# Patient Record
Sex: FEMALE
[De-identification: names, ages, dates, MRNs, and addresses within clinical notes are randomized per-mention and may not be internally consistent; named-entity substitution may affect disease eponyms.]

---

## 2023-02-21 ENCOUNTER — HOSPITAL ENCOUNTER (EMERGENCY)
Dept: HOSPITAL 97 - ER | Age: 53
Discharge: HOME | End: 2023-02-21
Payer: COMMERCIAL

## 2023-02-21 VITALS — TEMPERATURE: 98.8 F

## 2023-02-21 VITALS — SYSTOLIC BLOOD PRESSURE: 113 MMHG | DIASTOLIC BLOOD PRESSURE: 55 MMHG | OXYGEN SATURATION: 98 %

## 2023-02-21 DIAGNOSIS — K21.9: Primary | ICD-10-CM

## 2023-02-21 LAB
ALBUMIN SERPL BCP-MCNC: 3.7 G/DL (ref 3.4–5)
ALP SERPL-CCNC: 59 U/L (ref 45–117)
ALT SERPL W P-5'-P-CCNC: 26 U/L (ref 13–56)
AST SERPL W P-5'-P-CCNC: 15 U/L (ref 15–37)
BUN BLD-MCNC: 14 MG/DL (ref 7–18)
GLUCOSE SERPLBLD-MCNC: 112 MG/DL (ref 74–106)
HCT VFR BLD CALC: 43.1 % (ref 36–45)
LIPASE SERPL-CCNC: 189 U/L (ref 73–393)
LYMPHOCYTES # SPEC AUTO: 1.6 K/UL (ref 0.7–4.9)
MCV RBC: 87.8 FL (ref 80–100)
PMV BLD: 7.6 FL (ref 7.6–11.3)
POTASSIUM SERPL-SCNC: 3.3 MMOL/L (ref 3.5–5.1)
RBC # BLD: 4.91 M/UL (ref 3.86–4.86)
SP GR UR: 1.01 (ref 1–1.03)

## 2023-02-21 PROCEDURE — 85025 COMPLETE CBC W/AUTO DIFF WBC: CPT

## 2023-02-21 PROCEDURE — 36415 COLL VENOUS BLD VENIPUNCTURE: CPT

## 2023-02-21 PROCEDURE — 76705 ECHO EXAM OF ABDOMEN: CPT

## 2023-02-21 PROCEDURE — 99284 EMERGENCY DEPT VISIT MOD MDM: CPT

## 2023-02-21 PROCEDURE — 80053 COMPREHEN METABOLIC PANEL: CPT

## 2023-02-21 PROCEDURE — 93005 ELECTROCARDIOGRAM TRACING: CPT

## 2023-02-21 PROCEDURE — 81025 URINE PREGNANCY TEST: CPT

## 2023-02-21 PROCEDURE — 81003 URINALYSIS AUTO W/O SCOPE: CPT

## 2023-02-21 PROCEDURE — 96374 THER/PROPH/DIAG INJ IV PUSH: CPT

## 2023-02-21 PROCEDURE — 81015 MICROSCOPIC EXAM OF URINE: CPT

## 2023-02-21 PROCEDURE — 83690 ASSAY OF LIPASE: CPT

## 2023-02-21 NOTE — RAD REPORT
EXAM DESCRIPTION:  US - Abdomen Exam Limited - 2/21/2023 4:19 pm

 

CLINICAL HISTORY:  EPIGASTRIC PAIN

 

COMPARISON:  HEPATOBILIARY DUCTAL IMAGING dated 12/7/2011

 

TECHNIQUE:   Sonographic grayscale and color flow images of the right upper quadrant were obtained.

 

FINDINGS:  The gallbladder demonstrates small echogenic nonshadowing at current foci along the depend
ent and anti dependent wall, could reflect small adherent calculi or small polyps. No pericholecystic
 fluid or gallbladder wall thickening. The common bile duct is normal measuring 4 mm.

 

The liver demonstrates no findings of intrahepatic biliary dilatation.

 

IMPRESSION:  Small adherent calculi versus small polyps along the wall of the gallbladder. No sonogra
phic findings to suggest acute cholecystitis.

## 2023-02-21 NOTE — ER
Nurse's Notes                                                                                     

 [facility]                                                                 

Name: Sumi Calderon                                                                           

Age: 52 yrs                                                                                       

Sex: Female                                                                                       

: 1970                                                                                   

MRN: [id number]                                                                                   

Arrival Date: 2023                                                                          

Time: 15:01                                                                                       

Account#: [id number]                                                                            

Bed 18                                                                                            

Private MD:                                                                                       

Diagnosis: Epigastric pain;Gastro-esophageal reflux disease without esophagitis                   

                                                                                                  

Presentation:                                                                                     

                                                                                             

15:38 Chief complaint: Patient states: she feels like she has burning when she swallows.      ap3 

      patient also reports constipation. last bowel movement was yesterday but it was very        

      little. patient has pain in her esophagus at night when she lays down. Coronavirus          

      screen: At this time, the client does not indicate any symptoms associated with             

      coronavirus-19. Ebola Screen: No symptoms or risks identified at this time. Initial         

      Sepsis Screen: Does the patient meet any 2 criteria? No. Patient's initial sepsis           

      screen is negative. Does the patient have a suspected source of infection? No.              

      Patient's initial sepsis screen is negative. Risk Assessment: Do you want to hurt           

      yourself or someone else? Patient reports no desire to harm self or others.                 

15:38 Method Of Arrival: Ambulatory                                                           ap3 

15:38 Acuity: FELICITA 3                                                                           ap3 

15:46 Onset of symptoms was .                                                             ap3 

                                                                                                  

Triage Assessment:                                                                                

15:44 General: Appears in no apparent distress. Behavior is calm, cooperative. EENT: Reports  ap3 

      pain when swallowing. Neuro: Level of Consciousness is awake, alert, obeys commands,        

      Oriented to person, place, time, situation, Gait is steady, Speech is normal.               

      Cardiovascular: Patient's skin is warm and dry. Respiratory: Airway is patent               

      Respiratory effort is even, unlabored, Respiratory pattern is regular, symmetrical. GI:     

      Reports indigestion.                                                                        

                                                                                                  

Historical:                                                                                       

- Allergies:                                                                                      

15:43 No Known Allergies;                                                                     ap3 

- Home Meds:                                                                                      

15:43 None [Active];                                                                          ap3 

- PMHx:                                                                                           

15:43 Gastroesophageal reflux disease;                                                        ap3 

- PSHx:                                                                                           

15:43  section;                                                                       ap3 

                                                                                                  

- Immunization history:: Client reports receiving the 2nd dose of the Covid vaccine,              

  Flu vaccine is not up to date.                                                                  

- Social history:: Smoking status: Patient denies any tobacco usage or history of.                

  Patient uses.                                                                                   

                                                                                                  

                                                                                                  

Screening:                                                                                        

15:42 [facility] ED Fall Risk Assessment (Adult) History of falling in the last 3 months,       ap3 

      including since admission No falls in past 3 months (0 pts). Abuse screen: Denies           

      threats or abuse. Nutritional screening: No deficits noted. Tuberculosis screening: No      

      symptoms or risk factors identified.                                                        

                                                                                                  

Assessment:                                                                                       

15:42 Pain: Complains of pain in mid-sternal area. Respiratory: Airway is patent Respiratory  ap3 

      effort is even, unlabored.                                                                  

19:52 Respiratory: Breath sounds are clear.                                                   aa9 

19:53 Reassessment: Patient appears in no apparent distress at this time. Patient and/or      aa9 

      family updated on plan of care and expected duration. Pain level reassessed. Patient is     

      alert, oriented x 3, equal unlabored respirations, skin warm/dry/pink. Patient denies       

      pain at this time. Patient states feeling better.                                           

                                                                                                  

Vital Signs:                                                                                      

15:38  / 83; Pulse 92; Resp 18; Temp 98.8; Pulse Ox 100% ; Weight 63.5 kg;              ap3 

19:52  / 55; Pulse 85; Resp 19 S; Pulse Ox 98% on R/A;                                  aa9 

                                                                                                  

ED Course:                                                                                        

15:01 Patient arrived in ED.                                                                  as  

15:16 Rito Singh PA is PHCP.                                                                cp  

15:16 Rito Sexton MD is Attending Physician.                                             cp  

15:41 Triage completed.                                                                       ap3 

15:42 Arm band placed on right wrist.                                                         ap3 

17:38 Ginger Hurst, RN is Primary Nurse.                                                      kr3 

18:01 Urine Pregnancy--Ancillary (enter results) Sent.                                        kr3 

18:12 Urine Microscopic Only Sent.                                                            kr3 

18:18 CBC with Diff Sent.                                                                     mm9 

18:18 CMP Sent.                                                                               mm9 

18:18 Lipase Sent.                                                                            mm9 

18:19 Patient has correct armband on for positive identification. Bed in low position. Call   mm9 

      light in reach. Side rails up X 1. Adult w/ patient. Warm blanket given. Pulse ox on.       

      NIBP on.                                                                                    

19:38 Hussain Luna MD is Referral Physician.                                              cp  

19:52 No provider procedures requiring assistance completed. IV discontinued, intact,         aa9 

      bleeding controlled, No redness/swelling at site. Pressure dressing applied.                

                                                                                                  

Administered Medications:                                                                         

18:49 Drug: Zofran (Ondansetron) 4 mg Route: IVP; Site: right antecubital;                    kr3 

19:52 Drug: Potassium Effervescent Tablet 50 mEq Route: PO;                                   aa9 

19:52 Follow up: Response: No adverse reaction                                                aa9 

                                                                                                  

                                                                                                  

Medication:                                                                                       

19:52 VIS not applicable for this client.                                                     aa9 

                                                                                                  

Outcome:                                                                                          

19:41 Discharge ordered by MD.                                                                leslie  

19:52 Discharged to home ambulatory, with family.                                             aa9 

19:52 Condition: stable                                                                           

19:52 Discharge instructions given to patient, family, Instructed on discharge instructions,      

      follow up and referral plans. medication usage, Demonstrated understanding of               

      instructions, follow-up care, medications, Prescriptions given X 2.                         

19:53 Patient left the ED.                                                                    aa9 

                                                                                                  

Signatures:                                                                                       

Kailee Mota Corey, Kay Robins cp RN                    RN   ap3                                                  

Stephie Anaya RN                       RN   aa9                                                  

Ginger Hurst RN                        RN   kr3                                                  

Kia Mota                              mm9                                                  

                                                                                                  

**************************************************************************************************

## 2023-02-21 NOTE — EDPHYS
Physician Documentation                                                                           

 [facility]                                                                 

Name: Sumi Calderon                                                                           

Age: 52 yrs                                                                                       

Sex: Female                                                                                       

: 1970                                                                                   

MRN: [id number]                                                                                   

Arrival Date: 2023                                                                          

Time: 15:01                                                                                       

Account#: [id number]                                                                            

Bed 18                                                                                            

Private MD:                                                                                       

ED Physician Rito Sexton                                                                      

HPI:                                                                                              

                                                                                             

16:00 This 52 yrs old  Female presents to ER via Ambulatory with complaints of Sore   cp  

      Throat, Weakness, Constipation, Vaginal Pain, Numbness.                                     

16:00 The patient presents with sore throat.                                                  cp  

16:00 Onset: The symptoms/episode began/occurred gradually. Severity of symptoms: in the        

      emergency department the symptoms are unchanged. Associated signs and symptoms:             

      Pertinent positives: epigastric burning, general weakness, Pertinent negatives fever,       

      vomiting. Patient reports symptoms worse at night when lying back. Brought GI reports       

      from  that showed upper endoscopy pictures and patient reports history of gastric       

      polyps. Patient communicates using sign language and sister accompanies patient and         

      acts as .                                                                         

                                                                                                  

Historical:                                                                                       

- Allergies:                                                                                      

15:43 No Known Allergies;                                                                     ap3 

- Home Meds:                                                                                      

15:43 None [Active];                                                                          ap3 

- PMHx:                                                                                           

15:43 Gastroesophageal reflux disease;                                                        ap3 

- PSHx:                                                                                           

15:43  section;                                                                       ap3 

                                                                                                  

- Immunization history:: Client reports receiving the 2nd dose of the Covid vaccine,              

  Flu vaccine is not up to date.                                                                  

- Social history:: Smoking status: Patient denies any tobacco usage or history of.                

  Patient uses.                                                                                   

                                                                                                  

                                                                                                  

ROS:                                                                                              

16:05 Constitutional: Negative for body aches, chills, fever, poor PO intake.                 cp  

16:05 Eyes: Negative for injury, pain, redness, and discharge.                                cp  

16:05 ENT: Positive for sore throat, Negative for drainage from ear(s), ear pain, difficulty      

      swallowing, difficulty handling secretions.                                                 

16:05 Cardiovascular: Positive for substernal pain described as burning, Negative for edema,      

      palpitations.                                                                               

16:05 Respiratory: Negative for cough, shortness of breath, wheezing.                             

16:05 Abdomen/GI: Positive for abdominal pain, of the epigastric area, Negative for diarrhea,     

      constipation, dysphagia.                                                                    

16:05 Back: Negative for pain at rest, pain with movement.                                        

16:05 : Negative for urinary symptoms.                                                          

16:05 Neuro: Positive for weakness, Negative for altered mental status, headache, syncope.        

16:05 All other systems are negative.                                                             

                                                                                                  

Exam:                                                                                             

16:10 Constitutional: The patient appears in no acute distress, alert, awake,                 cp  

      non-diaphoretic, non-toxic, well developed, well nourished.                                 

16:10 Head/Face:  Normocephalic, atraumatic.                                                  cp  

16:10 Eyes: Periorbital structures: appear normal, Conjunctiva: normal, no exudate, no            

      injection, Sclera: no appreciated abnormality, Lids and lashes: appear normal,              

      bilaterally.                                                                                

16:10 ENT: External ear(s): are unremarkable, Ear canal(s): are normal, clear, TM's:              

      dullness, bilaterally, Nose: is normal, Mouth: Lips: moist, Oral mucosa: pink and           

      intact, moist, Posterior pharynx: Airway: no evidence of obstruction, patent, Tonsils:      

      no enlargement, no exudate, swelling, is not appreciated, erythema, is not appreciated,     

      exudate, is not appreciated.                                                                

16:10 Neck: ROM/movement: is normal, is supple, without pain, no range of motions                 

      limitations, no meningismus.                                                                

16:10 Chest/axilla: Inspection: normal, Palpation: is normal, no crepitus, no tenderness.         

16:10 Cardiovascular: Rate: normal, Rhythm: regular, Edema: is not appreciated, JVD: is not       

      appreciated.                                                                                

16:10 Respiratory: the patient does not display signs of respiratory distress,  Respirations:     

      normal, no use of accessory muscles, no retractions, labored breathing, is not present,     

      Breath sounds: are clear throughout, no decreased breath sounds, no stridor, no             

      wheezing.                                                                                   

16:10 Abdomen/GI: Inspection: abdomen appears normal, Bowel sounds: active, all quadrants,        

      Palpation: soft, in all quadrants, mild abdominal tenderness, in the epigastric area,       

      rebound tenderness, is not appreciated, involuntary guarding, is not appreciated.           

16:10 Back: pain, is absent, ROM is normal.                                                       

16:10 Neuro: Orientation: to person, place \T\ time. Mentation: is normal, Cerebellar function:   

      is grossly normal, Motor: moves all fours, strength is normal, Sensation: is normal.        

18:29 ECG was reviewed by the Attending Physician.                                            cp  

                                                                                                  

Vital Signs:                                                                                      

15:38  / 83; Pulse 92; Resp 18; Temp 98.8; Pulse Ox 100% ; Weight 63.5 kg;              ap3 

19:52  / 55; Pulse 85; Resp 19 S; Pulse Ox 98% on R/A;                                  aa9 

                                                                                                  

MDM:                                                                                              

15:47 Patient medically screened.                                                               

19:40 Data reviewed: vital signs, nurses notes, lab test result(s), EKG, radiologic studies,  cp  

      ultrasound.                                                                                 

                                                                                                  

                                                                                             

15:49 Order name: CBC with Diff                                                                 

                                                                                             

15:49 Order name: CMP                                                                           

                                                                                             

15:49 Order name: Lipase                                                                        

                                                                                             

15:49 Order name: Urine Microscopic Only                                                        

                                                                                             

17:43 Order name: Urine Dipstick-Ancillary; Complete Time: 17:59                              EDMS

                                                                                             

18:00 Interpretation: Normal except: UESTR 1+.                                                  

                                                                                             

17:46 Order name: Urine Pregnancy--Ancillary (enter results)                                  em1 

                                                                                             

15:49 Order name: US Abdomen Limited: gallbladder                                               

                                                                                             

17:07 Order name: US; Complete Time: 17:26                                                    EDMS

                                                                                             

18:00 Interpretation: Report reviewed.                                                          

                                                                                             

17:49 Order name: Urine Pregnancy--Ancillary; Complete Time: 17:59                            EDMS

                                                                                             

17:52 Order name: Urine Microscopic Only; Complete Time: 17:59                                EDMS

                                                                                             

18:28 Order name: CBC with Automated Diff; Complete Time: 18:43                               EDMS

                                                                                             

18:43 Interpretation: Normal except: RBC 4.91.                                                  

                                                                                             

18:45 Order name: Comprehensive Metabolic Panel; Complete Time: 19:23                         EDMS

                                                                                             

19:23 Interpretation: Normal except: K 3.3; GLUC 112; GLOB 4.1; A/G 0.9.                        

                                                                                             

18:45 Order name: Lipase; Complete Time: 19:23                                                EDMS

                                                                                             

15:49 Order name: IV Saline Lock; Complete Time: 18:19                                          

                                                                                             

15:49 Order name: Labs collected and sent; Complete Time: 18:19                                 

                                                                                             

15:49 Order name: Urine Dipstick-Ancillary (obtain specimen); Complete Time: 17:45              

                                                                                             

15:49 Order name: Urine Pregnancy Test (obtain specimen); Complete Time: 17:45                  

                                                                                             

15:49 Order name: NPO; Complete Time: 18:01                                                     

                                                                                             

17:26 Order name: EKG; Complete Time: 17:27                                                   cp  

                                                                                             

17:26 Order name: EKG - Nurse/Tech; Complete Time: 18:18                                      cp  

                                                                                                  

EC:29 Rate is 73 beats/min. Rhythm is regular. OR interval is normal. QRS interval is normal. cp  

      QT interval is normal. T waves are Inverted in lead aVR. Interpreted by me. Reviewed by     

      me.                                                                                         

                                                                                                  

Administered Medications:                                                                         

18:49 Drug: Zofran (Ondansetron) 4 mg Route: IVP; Site: right antecubital;                    kr3 

19:52 Drug: Potassium Effervescent Tablet 50 mEq Route: PO;                                   aa9 

19:52 Follow up: Response: No adverse reaction                                                aa9 

                                                                                                  

                                                                                                  

Disposition Summary:                                                                              

23 19:41                                                                                    

Discharge Ordered                                                                                 

      Location: Home                                                                          cp  

      Problem: new                                                                            cp  

      Symptoms: have improved                                                                 cp  

      Condition: Stable                                                                       cp  

      Diagnosis                                                                                   

        - Epigastric pain                                                                     cp  

        - Gastro-esophageal reflux disease without esophagitis                                cp  

      Followup:                                                                               cp  

        - With: Hussain Luna MD                                                                

        - When: 2 - 3 days                                                                         

        - Reason: Recheck today's complaints                                                       

      Discharge Instructions:                                                                     

        - Discharge Summary Sheet                                                             cp  

        - Abdominal Pain, Adult                                                               cp  

        - Gastroesophageal Reflux Disease, Adult                                              cp  

        - Indigestion                                                                         cp  

      Forms:                                                                                      

        - Medication Reconciliation Form                                                      cp  

        - Thank You Letter                                                                    cp  

        - Antibiotic Education                                                                cp  

        - Prescription Opioid Use                                                             cp  

      Prescriptions:                                                                              

        - Protonix 40 mg Oral Tablet                                                               

            - take 1 tablet by ORAL route once daily; 30 tablet; Refills: 0, Product          cp  

      Selection Permitted                                                                         

        - Zofran 4 mg Oral Tablet                                                                  

            - take 1 tablet by ORAL route every 12 hours As needed; 20 tablet; Refills: 0,    cp  

      Product Selection Permitted                                                                 

Signatures:                                                                                       

Dispatcher [employer]                           EDMS                                                 

Rito Singh PA PA   cp                                                   

Kay Woods RN                    RN   ap3                                                  

Stephie Anaya RN                       RN   aa9                                                  

Ginger Hurst RN                        RN   kr3                                                  

                                                                                                  

Corrections: (The following items were deleted from the chart)                                    

                                                                                             

18:28  19:40 Data reviewed: vital signs, nurses notes, lab test result(s), EKG,          cp  

      radiologic studies, plain films, ultrasound, cp                                             

                                                                                                  

**************************************************************************************************

## 2023-02-22 NOTE — EKG
Test Date:    2023-02-21               Test Time:    18:23:13

Technician:   MATTHEW                                    

                                                     

MEASUREMENT RESULTS:                                       

Intervals:                                           

Rate:         73                                     

ID:           [id number]                                    

QRSD:         70                                     

QT:           396                                    

QTc:          436                                    

Axis:                                                

P:            59                                     

ID:           [id number]                                    

QRS:          31                                     

T:            58                                     

                                                     

INTERPRETIVE STATEMENTS:                                       

                                                     

Normal sinus rhythm

Low voltage QRS

Borderline ECG

No previous ECG available for comparison



Electronically Signed On 02-22-23 15:19:42 CST by Jeffery Alcantar